# Patient Record
Sex: FEMALE | Race: WHITE | Employment: OTHER | URBAN - METROPOLITAN AREA
[De-identification: names, ages, dates, MRNs, and addresses within clinical notes are randomized per-mention and may not be internally consistent; named-entity substitution may affect disease eponyms.]

---

## 2020-01-12 ENCOUNTER — HOSPITAL ENCOUNTER (OUTPATIENT)
Age: 85
Setting detail: OBSERVATION
Discharge: HOME | End: 2020-01-14
Attending: STUDENT IN AN ORGANIZED HEALTH CARE EDUCATION/TRAINING PROGRAM | Admitting: HOSPITALIST
Payer: MEDICARE

## 2020-01-12 ENCOUNTER — APPOINTMENT (OUTPATIENT)
Dept: CT IMAGING | Age: 85
End: 2020-01-12
Attending: STUDENT IN AN ORGANIZED HEALTH CARE EDUCATION/TRAINING PROGRAM
Payer: MEDICARE

## 2020-01-12 DIAGNOSIS — E87.6 HYPOKALEMIA: Primary | ICD-10-CM

## 2020-01-12 PROBLEM — R55 SYNCOPE: Status: ACTIVE | Noted: 2020-01-12

## 2020-01-12 LAB
ALBUMIN SERPL-MCNC: 3.1 G/DL (ref 3.5–5)
ALBUMIN/GLOB SERPL: 0.8 {RATIO} (ref 1.1–2.2)
ALP SERPL-CCNC: 92 U/L (ref 45–117)
ALT SERPL-CCNC: 82 U/L (ref 12–78)
ANION GAP SERPL CALC-SCNC: 8 MMOL/L (ref 5–15)
ANION GAP SERPL CALC-SCNC: 8 MMOL/L (ref 5–15)
APPEARANCE UR: ABNORMAL
AST SERPL-CCNC: 109 U/L (ref 15–37)
ATRIAL RATE: 85 BPM
ATRIAL RATE: 93 BPM
BACTERIA URNS QL MICRO: ABNORMAL /HPF
BASOPHILS # BLD: 0.1 K/UL (ref 0–0.1)
BASOPHILS NFR BLD: 0 % (ref 0–1)
BILIRUB SERPL-MCNC: 0.9 MG/DL (ref 0.2–1)
BILIRUB UR QL: NEGATIVE
BUN SERPL-MCNC: 40 MG/DL (ref 6–20)
BUN SERPL-MCNC: 42 MG/DL (ref 6–20)
BUN/CREAT SERPL: 41 (ref 12–20)
BUN/CREAT SERPL: 43 (ref 12–20)
CALCIUM SERPL-MCNC: 9 MG/DL (ref 8.5–10.1)
CALCIUM SERPL-MCNC: 9.3 MG/DL (ref 8.5–10.1)
CALCULATED P AXIS, ECG09: 61 DEGREES
CALCULATED P AXIS, ECG09: 64 DEGREES
CALCULATED R AXIS, ECG10: 72 DEGREES
CALCULATED R AXIS, ECG10: 76 DEGREES
CALCULATED T AXIS, ECG11: -60 DEGREES
CALCULATED T AXIS, ECG11: 33 DEGREES
CHLORIDE SERPL-SCNC: 85 MMOL/L (ref 97–108)
CHLORIDE SERPL-SCNC: 85 MMOL/L (ref 97–108)
CO2 SERPL-SCNC: 39 MMOL/L (ref 21–32)
CO2 SERPL-SCNC: 40 MMOL/L (ref 21–32)
COLOR UR: ABNORMAL
CREAT SERPL-MCNC: 0.94 MG/DL (ref 0.55–1.02)
CREAT SERPL-MCNC: 1.02 MG/DL (ref 0.55–1.02)
DIAGNOSIS, 93000: NORMAL
DIAGNOSIS, 93000: NORMAL
DIFFERENTIAL METHOD BLD: ABNORMAL
EOSINOPHIL # BLD: 0.1 K/UL (ref 0–0.4)
EOSINOPHIL NFR BLD: 1 % (ref 0–7)
EPITH CASTS URNS QL MICRO: ABNORMAL /LPF
ERYTHROCYTE [DISTWIDTH] IN BLOOD BY AUTOMATED COUNT: 12.7 % (ref 11.5–14.5)
GLOBULIN SER CALC-MCNC: 3.7 G/DL (ref 2–4)
GLUCOSE SERPL-MCNC: 110 MG/DL (ref 65–100)
GLUCOSE SERPL-MCNC: 117 MG/DL (ref 65–100)
GLUCOSE UR STRIP.AUTO-MCNC: NEGATIVE MG/DL
HCT VFR BLD AUTO: 45.8 % (ref 35–47)
HGB BLD-MCNC: 15.3 G/DL (ref 11.5–16)
HGB UR QL STRIP: NEGATIVE
IMM GRANULOCYTES # BLD AUTO: 0.1 K/UL (ref 0–0.04)
IMM GRANULOCYTES NFR BLD AUTO: 0 % (ref 0–0.5)
KETONES UR QL STRIP.AUTO: 40 MG/DL
LEUKOCYTE ESTERASE UR QL STRIP.AUTO: ABNORMAL
LIPASE SERPL-CCNC: 353 U/L (ref 73–393)
LYMPHOCYTES # BLD: 1.3 K/UL (ref 0.8–3.5)
LYMPHOCYTES NFR BLD: 11 % (ref 12–49)
MAGNESIUM SERPL-MCNC: 2.3 MG/DL (ref 1.6–2.4)
MAGNESIUM SERPL-MCNC: 2.4 MG/DL (ref 1.6–2.4)
MCH RBC QN AUTO: 29.9 PG (ref 26–34)
MCHC RBC AUTO-ENTMCNC: 33.4 G/DL (ref 30–36.5)
MCV RBC AUTO: 89.5 FL (ref 80–99)
MONOCYTES # BLD: 1.2 K/UL (ref 0–1)
MONOCYTES NFR BLD: 10 % (ref 5–13)
NEUTS SEG # BLD: 9.1 K/UL (ref 1.8–8)
NEUTS SEG NFR BLD: 78 % (ref 32–75)
NITRITE UR QL STRIP.AUTO: NEGATIVE
NRBC # BLD: 0 K/UL (ref 0–0.01)
NRBC BLD-RTO: 0 PER 100 WBC
P-R INTERVAL, ECG05: 118 MS
P-R INTERVAL, ECG05: 128 MS
PH UR STRIP: 7.5 [PH] (ref 5–8)
PLATELET # BLD AUTO: 261 K/UL (ref 150–400)
PMV BLD AUTO: 11.5 FL (ref 8.9–12.9)
POTASSIUM SERPL-SCNC: 2.6 MMOL/L (ref 3.5–5.1)
POTASSIUM SERPL-SCNC: 2.7 MMOL/L (ref 3.5–5.1)
PROT SERPL-MCNC: 6.8 G/DL (ref 6.4–8.2)
PROT UR STRIP-MCNC: 30 MG/DL
Q-T INTERVAL, ECG07: 432 MS
Q-T INTERVAL, ECG07: 460 MS
QRS DURATION, ECG06: 92 MS
QRS DURATION, ECG06: 96 MS
QTC CALCULATION (BEZET), ECG08: 514 MS
QTC CALCULATION (BEZET), ECG08: 571 MS
RBC # BLD AUTO: 5.12 M/UL (ref 3.8–5.2)
RBC #/AREA URNS HPF: ABNORMAL /HPF (ref 0–5)
SODIUM SERPL-SCNC: 132 MMOL/L (ref 136–145)
SODIUM SERPL-SCNC: 133 MMOL/L (ref 136–145)
SP GR UR REFRACTOMETRY: 1.02 (ref 1–1.03)
TROPONIN I SERPL-MCNC: <0.05 NG/ML
UR CULT HOLD, URHOLD: NORMAL
UROBILINOGEN UR QL STRIP.AUTO: 1 EU/DL (ref 0.2–1)
VENTRICULAR RATE, ECG03: 85 BPM
VENTRICULAR RATE, ECG03: 93 BPM
WBC # BLD AUTO: 11.7 K/UL (ref 3.6–11)
WBC URNS QL MICRO: ABNORMAL /HPF (ref 0–4)

## 2020-01-12 PROCEDURE — 96366 THER/PROPH/DIAG IV INF ADDON: CPT

## 2020-01-12 PROCEDURE — 74011250636 HC RX REV CODE- 250/636: Performed by: HOSPITALIST

## 2020-01-12 PROCEDURE — 83735 ASSAY OF MAGNESIUM: CPT

## 2020-01-12 PROCEDURE — 99284 EMERGENCY DEPT VISIT MOD MDM: CPT

## 2020-01-12 PROCEDURE — 70450 CT HEAD/BRAIN W/O DYE: CPT

## 2020-01-12 PROCEDURE — 99218 HC RM OBSERVATION: CPT

## 2020-01-12 PROCEDURE — 36415 COLL VENOUS BLD VENIPUNCTURE: CPT

## 2020-01-12 PROCEDURE — 96374 THER/PROPH/DIAG INJ IV PUSH: CPT

## 2020-01-12 PROCEDURE — 93005 ELECTROCARDIOGRAM TRACING: CPT

## 2020-01-12 PROCEDURE — 85025 COMPLETE CBC W/AUTO DIFF WBC: CPT

## 2020-01-12 PROCEDURE — 77030019905 HC CATH URETH INTMIT MDII -A

## 2020-01-12 PROCEDURE — 84484 ASSAY OF TROPONIN QUANT: CPT

## 2020-01-12 PROCEDURE — 96365 THER/PROPH/DIAG IV INF INIT: CPT

## 2020-01-12 PROCEDURE — 80053 COMPREHEN METABOLIC PANEL: CPT

## 2020-01-12 PROCEDURE — 74011250636 HC RX REV CODE- 250/636: Performed by: STUDENT IN AN ORGANIZED HEALTH CARE EDUCATION/TRAINING PROGRAM

## 2020-01-12 PROCEDURE — 83690 ASSAY OF LIPASE: CPT

## 2020-01-12 PROCEDURE — 96361 HYDRATE IV INFUSION ADD-ON: CPT

## 2020-01-12 PROCEDURE — 81001 URINALYSIS AUTO W/SCOPE: CPT

## 2020-01-12 PROCEDURE — 74011250637 HC RX REV CODE- 250/637: Performed by: STUDENT IN AN ORGANIZED HEALTH CARE EDUCATION/TRAINING PROGRAM

## 2020-01-12 PROCEDURE — 74011250637 HC RX REV CODE- 250/637: Performed by: HOSPITALIST

## 2020-01-12 PROCEDURE — 96375 TX/PRO/DX INJ NEW DRUG ADDON: CPT

## 2020-01-12 RX ORDER — AMITRIPTYLINE HYDROCHLORIDE 10 MG/1
10 TABLET, FILM COATED ORAL
Status: DISCONTINUED | OUTPATIENT
Start: 2020-01-12 | End: 2020-01-12

## 2020-01-12 RX ORDER — SODIUM CHLORIDE 9 MG/ML
100 INJECTION, SOLUTION INTRAVENOUS CONTINUOUS
Status: DISCONTINUED | OUTPATIENT
Start: 2020-01-12 | End: 2020-01-12

## 2020-01-12 RX ORDER — PHENOL/SODIUM PHENOLATE
20 AEROSOL, SPRAY (ML) MUCOUS MEMBRANE DAILY
COMMUNITY

## 2020-01-12 RX ORDER — ROSUVASTATIN CALCIUM 10 MG/1
10 TABLET, COATED ORAL
COMMUNITY
End: 2020-01-12

## 2020-01-12 RX ORDER — ANASTROZOLE 1 MG/1
1 TABLET ORAL DAILY
COMMUNITY

## 2020-01-12 RX ORDER — POTASSIUM CHLORIDE 7.45 MG/ML
10 INJECTION INTRAVENOUS
Status: DISCONTINUED | OUTPATIENT
Start: 2020-01-12 | End: 2020-01-13

## 2020-01-12 RX ORDER — SODIUM CHLORIDE 0.9 % (FLUSH) 0.9 %
5-40 SYRINGE (ML) INJECTION AS NEEDED
Status: DISCONTINUED | OUTPATIENT
Start: 2020-01-12 | End: 2020-01-14 | Stop reason: HOSPADM

## 2020-01-12 RX ORDER — ONDANSETRON 2 MG/ML
4 INJECTION INTRAMUSCULAR; INTRAVENOUS
Status: DISCONTINUED | OUTPATIENT
Start: 2020-01-12 | End: 2020-01-12

## 2020-01-12 RX ORDER — ANASTROZOLE 1 MG/1
1 TABLET ORAL DAILY
Status: DISCONTINUED | OUTPATIENT
Start: 2020-01-13 | End: 2020-01-14 | Stop reason: HOSPADM

## 2020-01-12 RX ORDER — AMITRIPTYLINE HYDROCHLORIDE 10 MG/1
10 TABLET, FILM COATED ORAL
COMMUNITY

## 2020-01-12 RX ORDER — LOSARTAN POTASSIUM 50 MG/1
50 TABLET ORAL DAILY
COMMUNITY
End: 2020-01-14

## 2020-01-12 RX ORDER — DILTIAZEM HYDROCHLORIDE 180 MG/1
180 CAPSULE, COATED, EXTENDED RELEASE ORAL DAILY
Status: DISCONTINUED | OUTPATIENT
Start: 2020-01-13 | End: 2020-01-13

## 2020-01-12 RX ORDER — HYDROCHLOROTHIAZIDE 25 MG/1
25 TABLET ORAL DAILY
COMMUNITY
End: 2020-01-14

## 2020-01-12 RX ORDER — POTASSIUM CHLORIDE 750 MG/1
10 TABLET, FILM COATED, EXTENDED RELEASE ORAL DAILY
COMMUNITY

## 2020-01-12 RX ORDER — LOSARTAN POTASSIUM 50 MG/1
50 TABLET ORAL DAILY
Status: DISCONTINUED | OUTPATIENT
Start: 2020-01-13 | End: 2020-01-13

## 2020-01-12 RX ORDER — SODIUM CHLORIDE 0.9 % (FLUSH) 0.9 %
5-40 SYRINGE (ML) INJECTION EVERY 8 HOURS
Status: DISCONTINUED | OUTPATIENT
Start: 2020-01-12 | End: 2020-01-14 | Stop reason: HOSPADM

## 2020-01-12 RX ORDER — DILTIAZEM HYDROCHLORIDE EXTENDED-RELEASE TABLETS 180 MG/1
180 TABLET, EXTENDED RELEASE ORAL DAILY
COMMUNITY
End: 2020-01-14

## 2020-01-12 RX ORDER — ONDANSETRON 2 MG/ML
4 INJECTION INTRAMUSCULAR; INTRAVENOUS
Status: COMPLETED | OUTPATIENT
Start: 2020-01-12 | End: 2020-01-12

## 2020-01-12 RX ORDER — TIZANIDINE 4 MG/1
4 TABLET ORAL
COMMUNITY

## 2020-01-12 RX ORDER — NALOXONE HYDROCHLORIDE 0.4 MG/ML
0.4 INJECTION, SOLUTION INTRAMUSCULAR; INTRAVENOUS; SUBCUTANEOUS AS NEEDED
Status: DISCONTINUED | OUTPATIENT
Start: 2020-01-12 | End: 2020-01-14 | Stop reason: HOSPADM

## 2020-01-12 RX ORDER — AMITRIPTYLINE HYDROCHLORIDE 10 MG/1
10 TABLET, FILM COATED ORAL
Status: DISCONTINUED | OUTPATIENT
Start: 2020-01-12 | End: 2020-01-14 | Stop reason: HOSPADM

## 2020-01-12 RX ORDER — POTASSIUM CHLORIDE AND SODIUM CHLORIDE 900; 300 MG/100ML; MG/100ML
INJECTION, SOLUTION INTRAVENOUS CONTINUOUS
Status: DISCONTINUED | OUTPATIENT
Start: 2020-01-12 | End: 2020-01-14 | Stop reason: HOSPADM

## 2020-01-12 RX ORDER — ONDANSETRON 4 MG/1
4 TABLET, ORALLY DISINTEGRATING ORAL
COMMUNITY

## 2020-01-12 RX ORDER — ASPIRIN 81 MG/1
TABLET ORAL DAILY
COMMUNITY

## 2020-01-12 RX ORDER — ONDANSETRON 2 MG/ML
4 INJECTION INTRAMUSCULAR; INTRAVENOUS
Status: DISCONTINUED | OUTPATIENT
Start: 2020-01-12 | End: 2020-01-14 | Stop reason: HOSPADM

## 2020-01-12 RX ADMIN — POTASSIUM CHLORIDE 10 MEQ: 10 INJECTION, SOLUTION INTRAVENOUS at 16:40

## 2020-01-12 RX ADMIN — Medication 10 ML: at 21:46

## 2020-01-12 RX ADMIN — SODIUM CHLORIDE 1000 ML: 900 INJECTION, SOLUTION INTRAVENOUS at 12:43

## 2020-01-12 RX ADMIN — POTASSIUM CHLORIDE 10 MEQ: 10 INJECTION, SOLUTION INTRAVENOUS at 21:46

## 2020-01-12 RX ADMIN — AMITRIPTYLINE HYDROCHLORIDE 10 MG: 10 TABLET, FILM COATED ORAL at 22:22

## 2020-01-12 RX ADMIN — SODIUM CHLORIDE 1000 ML: 900 INJECTION, SOLUTION INTRAVENOUS at 10:27

## 2020-01-12 RX ADMIN — SODIUM CHLORIDE 100 ML/HR: 900 INJECTION, SOLUTION INTRAVENOUS at 21:46

## 2020-01-12 RX ADMIN — POTASSIUM BICARBONATE 20 MEQ: 782 TABLET, EFFERVESCENT ORAL at 12:43

## 2020-01-12 RX ADMIN — POTASSIUM CHLORIDE 10 MEQ: 10 INJECTION, SOLUTION INTRAVENOUS at 12:43

## 2020-01-12 RX ADMIN — ONDANSETRON 4 MG: 2 INJECTION INTRAMUSCULAR; INTRAVENOUS at 12:49

## 2020-01-12 NOTE — PROGRESS NOTES
Admission Medication Reconciliation:    Information obtained from:  Patient's daughter, Carla Perez data available¹:  YES    Comments/Recommendations: Updated PTA meds/reviewed patient's allergies. Patient and her daughter are somewhat reliable historians. Interviewed both regarding use of PTA medications including prescription/OTC, vitamins, supplements, inhaled, topical, injectable, otic and ophthalmic medications. Also inquired as to use of alcohol, nicotine products, THC and related compounds, illicit drugs, stimulant use (i.e., Red Bull), agents used to assist with sleep and/or pain control issues, and whether patient uses other people's medications of any kind. Notes:  1. Elavil: states that she takes a \"low dose\" every night and has had for years, for her foot pain. RX Query has no information on this, I have entered lowest dose now (so as not to disrupt therapy) and will update once daughter brings medication bottle in.  2. Oral intake: She is scheduled for surgery ( GJ tube)  in 6 days (in [place]). She only can tolerates full liquid diet lately. 3. ASA: held x 2 weeks in anticipation of upcoming surgical procedure  4. Flu vaccine is current    Medication changes (never reviewed, visiting from [place], [place]):  Added all agents    Thank you for allowing me to participate in the care of your patient. Jordyn Smith PharmD, RN #[id number] [facility] benefit data reflects medications filled and processed through the patient's insurance, however   this data does NOT capture whether the medication was picked up or is currently being taken by the patient. Allergies:  Patient has no known allergies.     Significant PMH/Disease States:   Past Medical History:   Diagnosis Date    Breast cancer ([facility] [address]     right    Hypercholesteremia      Chief Complaint for this Admission:    Chief Complaint   Patient presents with    Syncope     Prior to Admission Medications:   Prior to Admission Medications   Prescriptions Last Dose Informant Taking? Omeprazole delayed release (PRILOSEC D/R) 20 mg tablet 1/5/2020 at Unknown time  Yes   Sig: Take 20 mg by mouth daily. amitriptyline (ELAVIL) 10 mg tablet 1/11/2020 at Unknown time  Yes   Sig: Take 10 mg by mouth nightly. anastrozole (ARIMIDEX) 1 mg tablet 1/5/2020 at Unknown time  Yes   Sig: Take 1 mg by mouth daily. dilTIAZem ER (CARDIZEM LA) 180 mg Tb24 tablet 1/5/2020 at Unknown time  Yes   Sig: Take 180 mg by mouth daily. hydroCHLOROthiazide (HYDRODIURIL) 25 mg tablet 1/5/2020 at Unknown time  Yes   Sig: Take 25 mg by mouth daily. losartan (COZAAR) 50 mg tablet 1/5/2020 at Unknown time  Yes   Sig: Take 50 mg by mouth daily. ondansetron (ZOFRAN ODT) 4 mg disintegrating tablet 1/12/2020 at Unknown time  Yes   Sig: Take 4 mg by mouth every eight (8) hours as needed for Nausea or Vomiting. potassium chloride SR (KLOR-CON 10) 10 mEq tablet 1/5/2020 at Unknown time  Yes   Sig: Take 10 mEq by mouth daily. tiZANidine (ZANAFLEX) 4 mg tablet 1/5/2020 at Unknown time  Yes   Sig: Take 4 mg by mouth three (3) times daily as needed for Muscle Spasm(s). Facility-Administered Medications: None       Please contact the main inpatient pharmacy with any questions or concerns at [phone] and we will direct you to the clinical pharmacist covering this patient's care while in-house.    CHINA Barlow

## 2020-01-12 NOTE — ED PROVIDER NOTES
78-year-old woman with history of CBD obstruction noted for the past several months status post failed ERCP, scheduled for surgery in 6 days in where she resides presenting with a syncopal episode. Patient got out of bed, felt briefly lightheaded and dizzy struck her head on the corner of the dresser. Denies headache, diplopia, visual disturbance. Is asymptomatic at this time. Denies associated chest pain, palpitations, edema. She has had significant emesis over the past several days, patient has had this on and off for the past several months. She is scheduled to have a gastrojejunostomy performed surgically. Denies fevers or chills, dysuria. Past Medical History:   Diagnosis Date    Breast cancer ([facility] [address]     right    Hypercholesteremia        History reviewed. No pertinent surgical history. No family history on file.     Social History     Socioeconomic History    Marital status: Not on file     Spouse name: Not on file    Number of children: Not on file    Years of education: Not on file    Highest education level: Not on file   Occupational History    Not on file   Social Needs    Financial resource strain: Not on file    Food insecurity:     Worry: Not on file     Inability: Not on file    Transportation needs:     Medical: Not on file     Non-medical: Not on file   Tobacco Use    Smoking status: Not on file   Substance and Sexual Activity    Alcohol use: Not on file    Drug use: Not on file    Sexual activity: Not on file   Lifestyle    Physical activity:     Days per week: Not on file     Minutes per session: Not on file    Stress: Not on file   Relationships    Social connections:     Talks on phone: Not on file     Gets together: Not on file     Attends [employer]ism service: Not on file     Active member of club or organization: Not on file     Attends meetings of clubs or organizations: Not on file     Relationship status: Not on file    Intimate partner violence:     Fear of current or ex partner: Not on file     Emotionally abused: Not on file     Physically abused: Not on file     Forced sexual activity: Not on file   Other Topics Concern    Not on file   Social History Narrative    Not on file         ALLERGIES: Patient has no known allergies. Review of Systems   Constitutional: Negative for chills and fever. Eyes: Negative for photophobia. Respiratory: Negative for shortness of breath. Cardiovascular: Negative for chest pain. Gastrointestinal: Negative for abdominal pain. Genitourinary: Negative for dysuria. Musculoskeletal: Negative for back pain. Neurological: Negative for headaches. Psychiatric/Behavioral: Negative for confusion. All other systems reviewed and are negative. Vitals:    01/12/20 1009   Pulse: 92   Resp: 16   Temp: 97.5 °F (36.4 °C)   SpO2: 99%            Physical Exam  Vitals signs and nursing note reviewed. Constitutional:       General: She is not in acute distress. Appearance: She is well-developed. HENT:      Head: Normocephalic and atraumatic. Mouth/Throat:      Mouth: Mucous membranes are moist.      Pharynx: No oropharyngeal exudate. Eyes:      Extraocular Movements: Extraocular movements intact. Pupils: Pupils are equal, round, and reactive to light. Comments: 3mm abrasion L upper lid    Neck:      Musculoskeletal: Normal range of motion. Cardiovascular:      Rate and Rhythm: Normal rate. Pulmonary:      Effort: Pulmonary effort is normal.   Chest:      Chest wall: No tenderness. Abdominal:      General: There is no distension. Palpations: Abdomen is soft. Tenderness: There is no tenderness. Musculoskeletal:         General: No deformity. Comments: Entire spine palpated, no tenderness or deformity. Skin:     General: Skin is warm and dry. Capillary Refill: Capillary refill takes less than 2 seconds.    Neurological:      Mental Status: She is alert and oriented to person, place, and time. Psychiatric:         Behavior: Behavior normal.          MDM  Number of Diagnoses or Management Options  Hypokalemia:   Diagnosis management comments: Patient presenting with an episode of syncope on standing, has been getting progressively weaker over the past several weeks, has chronic CBD obstruction and has been on a liquid diet but regardless has been vomiting of late. Abdomen is soft and nontender. Patient does not appear to be septic or have cholangitis. Significant hypokalemia noted, patient would prefer not to be admitted but rather to return [place] where her medical providers are and where she has a surgery planned to have a GJ tube placed in a few days. Will correct potassium with IV repletion, reassess. 2:22 PM  Patient had an unusual side effect while receiving IV potassium, did complain of arm pain however also was feeling lightheaded and short of breath. Rhythm observed to be in sinus rhythm although was mildly tachycardic in the 100-110 range, infusion stopped for now. Repeat EKG with mildly prolonged QTC, no arrhythmias. Awaiting magnesium result, initial result hemolyzed. Will place a large-bore IV. Given her deficits and severe hypokalemia plan to admit for repletion. Hospitalist Akilah Serve for Admission  2:23 PM    ED Room Number: ER08/08  Patient Name and age:  Deann Germain 80 y.o.  female  Working Diagnosis: Hypokalemia  (primary encounter diagnosis)  Readmission: no  Isolation Requirements:  no  Recommended Level of Care:  telemetry  Code Status:  Full Code  Department:[facility] Adult ED - [phone]  Other:            Procedures      EKG: 10:17 AM  Normal sinus rhythm, scooped appearing ST depression in the inferior lateral leads likely secondary to electrolyte abnormality.   Normal axis,

## 2020-01-12 NOTE — ROUTINE PROCESS
TRANSFER - OUT REPORT:    Verbal report given to Zheng RN(name) on Guera Vásquez  being transferred to (unit) for routine progression of care       Report consisted of patients Situation, Background, Assessment and   Recommendations(SBAR). Information from the following report(s) SBAR, Kardex, ED Summary and MAR was reviewed with the receiving nurse. Lines:   Peripheral IV 01/12/20 Left; Lower Forearm (Active)   Site Assessment Clean, dry, & intact 1/12/2020 10:31 AM   Phlebitis Assessment 0 1/12/2020 10:31 AM   Infiltration Assessment 0 1/12/2020 10:31 AM   Dressing Status Clean, dry, & intact 1/12/2020 10:31 AM        Opportunity for questions and clarification was provided.       Patient transported with:   Needish

## 2020-01-12 NOTE — H&P
History & Physical    Primary Care Provider: Other, MD Maryam  Source of Information: Patient     History of Presenting Illness:   Roger Tristan is a 80 y.o. female who presents with syncope     51-year-old woman lives in [place], [place] who is here visiting her daughter since Velvet, came with episode of syncope. 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 CBD obstruction s/p stent and pancreatic abnormal which is still under workup. She is scheduled for surgery ( GJ tube)  in 6 days in her where she resides. She only can tolerates full liquid diet lately. She still has intermittent vomiting. Lost 20 lbs last 2-3 months. Today,  Patient got out of bed, felt briefly lightheaded and dizzy struck her head on the corner of the dresser. Denies headache, diplopia, visual disturbance. Is asymptomatic at this time. Denies associated chest pain, palpitations, edema. Denies fevers or chills, dysuria. Had similar syncope in [facility] in Dec and told was due to dehydration. Review of Systems:  General: HPI, sleep ok  HEENT: no headache, no vision changes, no nose discharge, no hearing changes   RES: no wheezing, no cough, no sob  CVS: no cp, no palpitation. Muscular: no joint swelling, no muscle pain, no leg swelling  Skin: no rash, no itching   GI: HPI   : no dysuria, no hematuria  Hemo: no gum bleeding, no petechial   Neuro: no sensation changes, no focal weakness   Endo: no polydipsia   Psych: denied depression     Past Medical History:   Diagnosis Date    Breast cancer ([address]     right    Hypercholesteremia       History reviewed. No pertinent surgical history. Prior to Admission medications    Not on File     No Known Allergies   History reviewed. No pertinent family history.      SOCIAL HISTORY:  Patient resides:  Independently x   Assisted Living    SNF    With family care       Smoking history: None x   Former    Chronic      Alcohol history:   None x   Social    Chronic      Ambulates:   Independently x   w/cane    w/walker    w/wc    CODE STATUS:  DNR    Full x   Other      Objective:     Physical Exam:     Visit Vitals  /61   Pulse 89   Temp 97.5 °F (36.4 °C)   Resp 15   SpO2 96%      O2 Device: Room air    General:  Alert, cooperative, no distress, appears stated age. Head:  Normocephalic, without obvious abnormality, atraumatic. Eyes:  Conjunctivae/corneas clear. PERRL, EOMs intact. Nose: Nares normal. Septum midline. Mucosa normal. No drainage or sinus tenderness. Throat: Lips, mucosa, and tonguedry. Neck: Supple, symmetrical, trachea midline, no adenopathy, thyroid: no enlargement/tenderness/nodules, no carotid bruit and no JVD. Back:   Symmetric, no curvature. ROM normal. No CVA tenderness. Lungs:   Clear to auscultation bilaterally. Chest wall:  No tenderness or deformity. Heart:  Regular rate and rhythm, S1, S2 normal, no murmur, click, rub or gallop. Abdomen:   Soft, non-tender. Bowel sounds normal. No masses,  No organomegaly. Extremities: Extremities normal, atraumatic, no cyanosis or edema. Pulses: 2+ and symmetric all extremities. Skin: Skin color, texture, turgor normal. No rashes or lesions   Neurologic: CNII-XII intact. No focal weakness              Data Review:     Recent Days:  Recent Labs     01/12/20  1019   WBC 11.7*   HGB 15.3   HCT 45.8        Recent Labs     01/12/20  1406 01/12/20  1109   * 132*   K 2.6* 2.7*   CL 85* 85*   CO2 40* 39*   * 117*   BUN 40* 42*   CREA 0.94 1.02   CA 9.3 9.0   MG 2.3 2.4   ALB  --  3.1*   SGOT  --  109*   ALT  --  82*     No results for input(s): PH, PCO2, PO2, HCO3, FIO2 in the last 72 hours.     24 Hour Results:  Recent Results (from the past 24 hour(s))   EKG, 12 LEAD, INITIAL    Collection Time: 01/12/20 10:17 AM   Result Value Ref Range    Ventricular Rate 85 BPM    Atrial Rate 85 BPM    P-R Interval 128 ms    QRS Duration 96 ms    Q-T Interval 432 ms    QTC Calculation (Bezet) 514 ms    Calculated P Axis 64 degrees    Calculated R Axis 72 degrees    Calculated T Axis -60 degrees    Diagnosis       Normal sinus rhythm  ST & T wave abnormality, consider inferolateral ischemia  Prolonged QT  No previous ECGs available     CBC WITH AUTOMATED DIFF    Collection Time: 01/12/20 10:19 AM   Result Value Ref Range    WBC 11.7 (H) 3.6 - 11.0 K/uL    RBC 5.12 3.80 - 5.20 M/uL    HGB 15.3 11.5 - 16.0 g/dL    HCT 45.8 35.0 - 47.0 %    MCV 89.5 80.0 - 99.0 FL    MCH 29.9 26.0 - 34.0 PG    MCHC 33.4 30.0 - 36.5 g/dL    RDW 12.7 11.5 - 14.5 %    PLATELET 716 269 - 242 K/uL    MPV 11.5 8.9 - 12.9 FL    NRBC 0.0 0  WBC    ABSOLUTE NRBC 0.00 0.00 - 0.01 K/uL    NEUTROPHILS 78 (H) 32 - 75 %    LYMPHOCYTES 11 (L) 12 - 49 %    MONOCYTES 10 5 - 13 %    EOSINOPHILS 1 0 - 7 %    BASOPHILS 0 0 - 1 %    IMMATURE GRANULOCYTES 0 0.0 - 0.5 %    ABS. NEUTROPHILS 9.1 (H) 1.8 - 8.0 K/UL    ABS. LYMPHOCYTES 1.3 0.8 - 3.5 K/UL    ABS. MONOCYTES 1.2 (H) 0.0 - 1.0 K/UL    ABS. EOSINOPHILS 0.1 0.0 - 0.4 K/UL    ABS. BASOPHILS 0.1 0.0 - 0.1 K/UL    ABS. IMM.  GRANS. 0.1 (H) 0.00 - 0.04 K/UL    DF AUTOMATED     TROPONIN I    Collection Time: 01/12/20 11:09 AM   Result Value Ref Range    Troponin-I, Qt. <0.05 <0.05 ng/mL   LIPASE    Collection Time: 01/12/20 11:09 AM   Result Value Ref Range    Lipase 353 73 - 728 U/L   METABOLIC PANEL, COMPREHENSIVE    Collection Time: 01/12/20 11:09 AM   Result Value Ref Range    Sodium 132 (L) 136 - 145 mmol/L    Potassium 2.7 (LL) 3.5 - 5.1 mmol/L    Chloride 85 (L) 97 - 108 mmol/L    CO2 39 (H) 21 - 32 mmol/L    Anion gap 8 5 - 15 mmol/L    Glucose 117 (H) 65 - 100 mg/dL    BUN 42 (H) 6 - 20 MG/DL    Creatinine 1.02 0.55 - 1.02 MG/DL    BUN/Creatinine ratio 41 (H) 12 - 20      GFR est AA >60 >60 ml/min/1.73m2    GFR est non-AA 51 (L) >60 ml/min/1.73m2    Calcium 9.0 8.5 - 10.1 MG/DL    Bilirubin, total 0.9 0.2 - 1.0 MG/DL    ALT (SGPT) 82 (H) 12 - 78 U/L    AST (SGOT) 109 (H) 15 - 37 U/L    Alk. phosphatase 92 45 - 117 U/L    Protein, total 6.8 6.4 - 8.2 g/dL    Albumin 3.1 (L) 3.5 - 5.0 g/dL    Globulin 3.7 2.0 - 4.0 g/dL    A-G Ratio 0.8 (L) 1.1 - 2.2     MAGNESIUM    Collection Time: 01/12/20 11:09 AM   Result Value Ref Range    Magnesium 2.4 1.6 - 2.4 mg/dL   EKG, 12 LEAD, INITIAL    Collection Time: 01/12/20  1:33 PM   Result Value Ref Range    Ventricular Rate 93 BPM    Atrial Rate 93 BPM    P-R Interval 118 ms    QRS Duration 92 ms    Q-T Interval 460 ms    QTC Calculation (Bezet) 571 ms    Calculated P Axis 61 degrees    Calculated R Axis 76 degrees    Calculated T Axis 33 degrees    Diagnosis       Normal sinus rhythm  Nonspecific ST and T wave abnormality  Prolonged QT  When compared with ECG of 12-JAN-2020 10:17,  MANUAL COMPARISON REQUIRED, DATA IS UNCONFIRMED     MAGNESIUM    Collection Time: 01/12/20  2:06 PM   Result Value Ref Range    Magnesium 2.3 1.6 - 2.4 mg/dL   METABOLIC PANEL, BASIC    Collection Time: 01/12/20  2:06 PM   Result Value Ref Range    Sodium 133 (L) 136 - 145 mmol/L    Potassium 2.6 (LL) 3.5 - 5.1 mmol/L    Chloride 85 (L) 97 - 108 mmol/L    CO2 40 (H) 21 - 32 mmol/L    Anion gap 8 5 - 15 mmol/L    Glucose 110 (H) 65 - 100 mg/dL    BUN 40 (H) 6 - 20 MG/DL    Creatinine 0.94 0.55 - 1.02 MG/DL    BUN/Creatinine ratio 43 (H) 12 - 20      GFR est AA >60 >60 ml/min/1.73m2    GFR est non-AA 56 (L) >60 ml/min/1.73m2    Calcium 9.3 8.5 - 10.1 MG/DL         Imaging:   Ct Head Wo Cont    Result Date: 1/12/2020  IMPRESSION: No acute abnormality. Assessment:     Active Problems:    Syncope (1/12/2020)           Plan:     1. Syncope: likely due to hypovolemia, orthostatic hypotension possible. Per history she also has some aortic stenosis. But patient and daughter are planning to return back [place] tomorrow morning after hydration. No further test will do.  She had echo done in [place] recently. 2. Hypokalemia: iv kcl 40meg, will repeat bmp tonight. 3. Hyponatremia: due to poor oral intake, she also took HCTZ, but not taking last 2 days. Should stop HCTZ. 4. HTN: hold bp meds today. Stop HCTZ. Can resume cardizem cd and losartan in am if no more orthostatic hypotension. 5. Gastric outlet obstruciton, patient prefer to go back to [place] to see her surgeon and proceed with her scheduled surgery.         Signed By: Julia Yen MD     January 12, 2020

## 2020-01-12 NOTE — ED TRIAGE NOTES
Triage note: Pt arrives via EMS with c/o syncopal episode. Head hit dresser. Pt on liquid diet since Christmas for SBO, surgery scheduled Friday. Denies blood thinners.

## 2020-01-13 LAB
ALBUMIN SERPL-MCNC: 2.6 G/DL (ref 3.5–5)
ALBUMIN/GLOB SERPL: 0.9 {RATIO} (ref 1.1–2.2)
ALP SERPL-CCNC: 78 U/L (ref 45–117)
ALT SERPL-CCNC: 52 U/L (ref 12–78)
ANION GAP SERPL CALC-SCNC: 5 MMOL/L (ref 5–15)
AST SERPL-CCNC: 41 U/L (ref 15–37)
BASOPHILS # BLD: 0.1 K/UL (ref 0–0.1)
BASOPHILS NFR BLD: 1 % (ref 0–1)
BILIRUB SERPL-MCNC: 0.7 MG/DL (ref 0.2–1)
BUN SERPL-MCNC: 28 MG/DL (ref 6–20)
BUN/CREAT SERPL: 47 (ref 12–20)
CALCIUM SERPL-MCNC: 8.6 MG/DL (ref 8.5–10.1)
CHLORIDE SERPL-SCNC: 94 MMOL/L (ref 97–108)
CO2 SERPL-SCNC: 34 MMOL/L (ref 21–32)
COMMENT, HOLDF: NORMAL
CREAT SERPL-MCNC: 0.6 MG/DL (ref 0.55–1.02)
DIFFERENTIAL METHOD BLD: ABNORMAL
EOSINOPHIL # BLD: 0.3 K/UL (ref 0–0.4)
EOSINOPHIL NFR BLD: 3 % (ref 0–7)
ERYTHROCYTE [DISTWIDTH] IN BLOOD BY AUTOMATED COUNT: 12.5 % (ref 11.5–14.5)
GLOBULIN SER CALC-MCNC: 3 G/DL (ref 2–4)
GLUCOSE SERPL-MCNC: 94 MG/DL (ref 65–100)
HCT VFR BLD AUTO: 36 % (ref 35–47)
HGB BLD-MCNC: 12 G/DL (ref 11.5–16)
IMM GRANULOCYTES # BLD AUTO: 0 K/UL (ref 0–0.04)
IMM GRANULOCYTES NFR BLD AUTO: 0 % (ref 0–0.5)
LYMPHOCYTES # BLD: 1.2 K/UL (ref 0.8–3.5)
LYMPHOCYTES NFR BLD: 13 % (ref 12–49)
MAGNESIUM SERPL-MCNC: 2.2 MG/DL (ref 1.6–2.4)
MCH RBC QN AUTO: 30.1 PG (ref 26–34)
MCHC RBC AUTO-ENTMCNC: 33.3 G/DL (ref 30–36.5)
MCV RBC AUTO: 90.2 FL (ref 80–99)
MONOCYTES # BLD: 1.3 K/UL (ref 0–1)
MONOCYTES NFR BLD: 14 % (ref 5–13)
NEUTS SEG # BLD: 6.8 K/UL (ref 1.8–8)
NEUTS SEG NFR BLD: 70 % (ref 32–75)
NRBC # BLD: 0 K/UL (ref 0–0.01)
NRBC BLD-RTO: 0 PER 100 WBC
PHOSPHATE SERPL-MCNC: 1.5 MG/DL (ref 2.6–4.7)
PLATELET # BLD AUTO: 207 K/UL (ref 150–400)
PMV BLD AUTO: 11.1 FL (ref 8.9–12.9)
POTASSIUM SERPL-SCNC: 2.6 MMOL/L (ref 3.5–5.1)
POTASSIUM SERPL-SCNC: 3.3 MMOL/L (ref 3.5–5.1)
PROT SERPL-MCNC: 5.6 G/DL (ref 6.4–8.2)
RBC # BLD AUTO: 3.99 M/UL (ref 3.8–5.2)
SAMPLES BEING HELD,HOLD: NORMAL
SODIUM SERPL-SCNC: 133 MMOL/L (ref 136–145)
WBC # BLD AUTO: 9.8 K/UL (ref 3.6–11)

## 2020-01-13 PROCEDURE — 99218 HC RM OBSERVATION: CPT

## 2020-01-13 PROCEDURE — 96366 THER/PROPH/DIAG IV INF ADDON: CPT

## 2020-01-13 PROCEDURE — 80053 COMPREHEN METABOLIC PANEL: CPT

## 2020-01-13 PROCEDURE — 97116 GAIT TRAINING THERAPY: CPT

## 2020-01-13 PROCEDURE — 96361 HYDRATE IV INFUSION ADD-ON: CPT

## 2020-01-13 PROCEDURE — 74011250636 HC RX REV CODE- 250/636: Performed by: HOSPITALIST

## 2020-01-13 PROCEDURE — 84132 ASSAY OF SERUM POTASSIUM: CPT

## 2020-01-13 PROCEDURE — 84100 ASSAY OF PHOSPHORUS: CPT

## 2020-01-13 PROCEDURE — 74011250636 HC RX REV CODE- 250/636: Performed by: STUDENT IN AN ORGANIZED HEALTH CARE EDUCATION/TRAINING PROGRAM

## 2020-01-13 PROCEDURE — 97161 PT EVAL LOW COMPLEX 20 MIN: CPT

## 2020-01-13 PROCEDURE — 74011000250 HC RX REV CODE- 250: Performed by: INTERNAL MEDICINE

## 2020-01-13 PROCEDURE — 74011250637 HC RX REV CODE- 250/637: Performed by: HOSPITALIST

## 2020-01-13 PROCEDURE — 83735 ASSAY OF MAGNESIUM: CPT

## 2020-01-13 PROCEDURE — 74011250636 HC RX REV CODE- 250/636: Performed by: INTERNAL MEDICINE

## 2020-01-13 PROCEDURE — 96376 TX/PRO/DX INJ SAME DRUG ADON: CPT

## 2020-01-13 PROCEDURE — 96367 TX/PROPH/DG ADDL SEQ IV INF: CPT

## 2020-01-13 PROCEDURE — 85025 COMPLETE CBC W/AUTO DIFF WBC: CPT

## 2020-01-13 PROCEDURE — 36415 COLL VENOUS BLD VENIPUNCTURE: CPT

## 2020-01-13 RX ORDER — POTASSIUM CHLORIDE 7.45 MG/ML
10 INJECTION INTRAVENOUS
Status: COMPLETED | OUTPATIENT
Start: 2020-01-13 | End: 2020-01-13

## 2020-01-13 RX ORDER — POTASSIUM CHLORIDE 14.9 MG/ML
10 INJECTION INTRAVENOUS
Status: DISCONTINUED | OUTPATIENT
Start: 2020-01-13 | End: 2020-01-13

## 2020-01-13 RX ADMIN — AMITRIPTYLINE HYDROCHLORIDE 10 MG: 10 TABLET, FILM COATED ORAL at 23:12

## 2020-01-13 RX ADMIN — Medication 10 ML: at 07:01

## 2020-01-13 RX ADMIN — POTASSIUM CHLORIDE AND SODIUM CHLORIDE: 900; 300 INJECTION, SOLUTION INTRAVENOUS at 00:20

## 2020-01-13 RX ADMIN — Medication 10 ML: at 23:13

## 2020-01-13 RX ADMIN — DILTIAZEM HYDROCHLORIDE 180 MG: 180 CAPSULE, COATED, EXTENDED RELEASE ORAL at 08:51

## 2020-01-13 RX ADMIN — POTASSIUM CHLORIDE 10 MEQ: 10 INJECTION, SOLUTION INTRAVENOUS at 08:00

## 2020-01-13 RX ADMIN — POTASSIUM CHLORIDE 10 MEQ: 10 INJECTION, SOLUTION INTRAVENOUS at 00:18

## 2020-01-13 RX ADMIN — ONDANSETRON 4 MG: 2 INJECTION INTRAMUSCULAR; INTRAVENOUS at 20:26

## 2020-01-13 RX ADMIN — POTASSIUM PHOSPHATE, MONOBASIC AND POTASSIUM PHOSPHATE, DIBASIC: 224; 236 INJECTION, SOLUTION, CONCENTRATE INTRAVENOUS at 07:44

## 2020-01-13 RX ADMIN — POTASSIUM CHLORIDE 10 MEQ: 10 INJECTION, SOLUTION INTRAVENOUS at 07:16

## 2020-01-13 NOTE — PROGRESS NOTES
A Spiritual Care Partner Volunteer visited patient in [facility] [address] on 01/13/2020.   Documented by:  Chaplain Prado MDiv, MS, [place] [id number] [phone] ([id number])

## 2020-01-13 NOTE — PROGRESS NOTES
[facility]  Hospitalist Group                                                                                          Hospitalist Progress Note  Le Jose MD  Answering service: [phone] OR [phone] from in house phone        Date of Service:  2020  NAME:  Alexandro Martinez  :  1932  MRN:  [id number]      Admission Summary:     49-year-old woman lives in [place], [place] who is here visiting her daughter since [place], came with episode of syncope. 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 CBD obstruction s/p stent and pancreatic abnormal which is still under workup. She is scheduled for surgery ( GJ tube)  in 6 days in her where she resides. She only can tolerates full liquid diet lately. She still has intermittent vomiting. Lost 20 lbs last 2-3 months. Today, Laurita Naidu got out of bed, felt briefly lightheaded and dizzy struck her head on the corner of the dresser.  Denies headache, diplopia, visual disturbance.  Is asymptomatic at this time.  Denies associated chest pain, palpitations, edema.   Denies fevers or chills, dysuria. Interval history / Subjective:       Patient is feeling better this morning. No acute complaint. Assessment & Plan:     Presyncope  Patient denies having loss of consciousness. Likely her presyncope is secondary to volume depletion with possible orthostatic hypotension. Off antihypertensives now, volume repletion with IV fluid. PT evaluated the patient and patient stable from PT standpoint. Hyponatremia  Also secondary to hypovolemic hyponatremia. Contributed by HCTZ, now on hold. Sodium level improving with normal saline. Continue monitor electrolytes. Hypokalemia  Secondary to poor p.o. intake and HCTZ. Repleting potassium. Monitor on telemetry. Monitor electrolytes. Hypertension  Hold all antihypertensives at this time. Monitor blood pressure.     History of gastric outlet obstruction  Patient to continue with full liquid diet with nutritional supplements. Patient have upcoming feeding tube placement scheduled in the near future in [place] and she gets back there. Code status: DNR  DVT prophylaxis: SCDs    Care Plan discussed with: Patient/Family and Nurse  Disposition: Home w/Family     Hospital Problems  Never Reviewed          Codes Class Noted POA    Syncope ICD-10-CM: R55  ICD-9-CM: 780.2  1/12/2020 Unknown                Review of Systems:   A comprehensive review of systems was negative except for that written in the HPI. Vital Signs:    Last 24hrs VS reviewed since prior progress note. Most recent are:  Visit Vitals  BP 97/62 (BP 1 Location: Left arm, BP Patient Position: At rest)   Pulse (!) 105   Temp 97.7 °F (36.5 °C)   Resp 14   Wt 57.6 kg (126 lb 15.8 oz)   SpO2 97%         Intake/Output Summary (Last 24 hours) at 1/13/2020 1639  Last data filed at 1/13/2020 0900  Gross per 24 hour   Intake 200 ml   Output    Net 200 ml        Physical Examination:             Constitutional:  No acute distress, cooperative, pleasant    ENT:  Oral mucous moist, oropharynx benign. Resp:  CTA bilaterally. No wheezing/rhonchi/rales. No accessory muscle use   CV:  Regular rhythm, normal rate, no murmurs, gallops, rubs    GI:  Soft, non distended, non tender. normoactive bowel sounds, no hepatosplenomegaly     Musculoskeletal:  No edema, warm, 2+ pulses throughout    Neurologic:  Moves all extremities.   AAOx3, CN II-XII reviewed     Skin:  Good turgor, no rashes or ulcers       Data Review:    Review and/or order of clinical lab test      Labs:     Recent Labs     01/13/20  0503 01/12/20  1019   WBC 9.8 11.7*   HGB 12.0 15.3   HCT 36.0 45.8    261     Recent Labs     01/13/20  0503 01/12/20  1406 01/12/20  1109   * 133* 132*   K 2.6* 2.6* 2.7*   CL 94* 85* 85*   CO2 34* 40* 39*   BUN 28* 40* 42*   CREA 0.60 0.94 1.02   GLU 94 110* 117*   CA 8.6 9.3 9.0   MG 2.2 2.3 2.4   PHOS 1.5*  --   --      Recent Labs     01/13/20  0503 01/12/20  1109   SGOT 41* 109*   ALT 52 82*   AP 78 92   TBILI 0.7 0.9   TP 5.6* 6.8   ALB 2.6* 3.1*   GLOB 3.0 3.7   LPSE  --  353     No results for input(s): INR, PTP, APTT, INREXT in the last 72 hours. No results for input(s): FE, TIBC, PSAT, FERR in the last 72 hours. No results found for: FOL, RBCF   No results for input(s): PH, PCO2, PO2 in the last 72 hours.   Recent Labs     01/12/20  1109   TROIQ <0.05     No results found for: CHOL, CHOLX, CHLST, CHOLV, HDL, HDLP, LDL, LDLC, DLDLP, TGLX, TRIGL, TRIGP, CHHD, CHHDX  No results found for: GLUCPOC  Lab Results   Component Value Date/Time    Color DARK YELLOW 01/12/2020 10:24 AM    Appearance TURBID (A) 01/12/2020 10:24 AM    Specific gravity 1.024 01/12/2020 10:24 AM    pH (UA) 7.5 01/12/2020 10:24 AM    Protein 30 (A) 01/12/2020 10:24 AM    Glucose NEGATIVE  01/12/2020 10:24 AM    Ketone 40 (A) 01/12/2020 10:24 AM    Bilirubin NEGATIVE  01/12/2020 10:24 AM    Urobilinogen 1.0 01/12/2020 10:24 AM    Nitrites NEGATIVE  01/12/2020 10:24 AM    Leukocyte Esterase TRACE (A) 01/12/2020 10:24 AM    Epithelial cells FEW 01/12/2020 10:24 AM    Bacteria 4+ (A) 01/12/2020 10:24 AM    WBC 0-4 01/12/2020 10:24 AM    RBC 0-5 01/12/2020 10:24 AM         Medications Reviewed:     Current Facility-Administered Medications   Medication Dose Route Frequency    sodium chloride (NS) flush 5-40 mL  5-40 mL IntraVENous Q8H    sodium chloride (NS) flush 5-40 mL  5-40 mL IntraVENous PRN    naloxone (NARCAN) injection 0.4 mg  0.4 mg IntraVENous PRN    amitriptyline (ELAVIL) tablet 10 mg  10 mg Oral QHS    anastrozole (ARIMIDEX) tablet 1 mg  1 mg Oral DAILY    ondansetron (ZOFRAN) injection 4 mg  4 mg IntraVENous Q6H PRN    0.9% sodium chloride with KCl 40 mEq/L infusion   IntraVENous CONTINUOUS     ______________________________________________________________________  EXPECTED LENGTH OF STAY: - - -  ACTUAL LENGTH OF STAY:          0                 Suma Spring MD

## 2020-01-13 NOTE — PROGRESS NOTES
Problem: Mobility Impaired (Adult and Pediatric)  Goal: *Acute Goals and Plan of Care (Insert Text)  Description  FUNCTIONAL STATUS PRIOR TO ADMISSION: Patient was independent and active without use of DME.    HOME SUPPORT PRIOR TO ADMISSION: The patient lived alone with friends to provide assistance - if needed. Daughter lives one hour away from pt's home. Physical Therapy Goals  Initiated 1/13/2020    1. Patient will transfer from bed to chair and chair to bed with independence using the least restrictive device within 7 day(s). 2.  Patient will perform sit to stand with independence within 7 day(s). 3.  Patient will ambulate with independence for > 200 feet - 2 - 3 x day with the least restrictive device within 7 day(s). 4.  Pt will decrease fall risk as evidenced by increase in Tinetti to > 25/28 within 7 days. Outcome: Progressing Towards Goal  PHYSICAL THERAPY EVALUATION  Patient: Jamie Doyle (55 y.o. female)  Date: 1/13/2020  Primary Diagnosis: Syncope [R55]        Precautions:  Fall      ASSESSMENT  Based on the objective data described below, the patient presents s/p syncope episode, and fall hitting head on dresser 1/12/2020. Recent hx - 20 lb weight loss over past 2 - 3 months, on full liquid diet with intermittent vomiting secondary to gastric outlet obstruction - [facility] Pt presenting with generalized weakness, Bp stable with position changes - noted increased HR, decreased endurance and decreased dynamic balance deficits - fall risk. Anticipate pt will be able to increase strength and endurance with increasing activity and improved nutrition. Vitals:    01/13/20 1407 01/13/20 1411 01/13/20 1413 01/13/20 1440   BP: 108/67 104/47 119/75 97/62   BP 1 Location: Left arm Left arm Left arm Left arm   BP Patient Position: At rest;Supine; Head of bed elevated (Comment degrees) Sitting Standing At rest   Pulse: (!) 109 79 (!) 130 (!) 105   Resp:    14   Temp:    97.7 °F (36.5 °C)   SpO2:    97%   Weight:            Current Level of Function Impacting Discharge (mobility/balance): Supervision to CGA for transfers/ambulation    Functional Outcome Measure: The patient scored 23/28 on the Tinetti outcome measure which is indicative of moderate fall risk. Other factors to consider for discharge: daughter plans to accompany pt at discharge to pt's home in [facility]     Patient will benefit from skilled therapy intervention to address the above noted impairments. PLAN :  Recommendations and Planned Interventions: transfer training, gait training, therapeutic exercises, patient and family training/education, and therapeutic activities      Frequency/Duration: Patient will be followed by physical therapy:  5 times a week to address goals. Recommendation for discharge: (in order for the patient to meet his/her long term goals)  No skilled physical therapy/ follow up rehabilitation needs identified at this time. This discharge recommendation:  Has been made in collaboration with the attending provider and/or case management    IF patient discharges home will need the following DME: none         SUBJECTIVE:   Patient stated I have not been OOB yet.     OBJECTIVE DATA SUMMARY:   HISTORY:    Past Medical History:   Diagnosis Date    Breast cancer ([address]     right    Hypercholesteremia    History reviewed. No pertinent surgical history.     Personal factors and/or comorbidities impacting plan of care: pt a retired nurse    Home Situation  Home Environment: Independent living  [address] [place] [address] St: One story  Living Alone: Yes  Patient Expects to be Discharged to[de-identified] Private residence  Current DME Used/Available at Home: None    EXAMINATION/PRESENTATION/DECISION MAKING:   Critical Behavior:  Neurologic State: Alert  Orientation Level: Oriented X4  Cognition: Appropriate decision making, Appropriate safety awareness, Follows commands  Safety/Judgement: Awareness of environment, Good awareness of safety precautions  Hearing: Auditory  Auditory Impairment: None  Range Of Motion:  AROM: Generally decreased, functional                       Strength:    Strength: Generally decreased, functional                    Tone & Sensation:   Tone: Normal              Sensation: Intact               Coordination:  Coordination: Within functional limits  Functional Mobility:  Bed Mobility:  Rolling: Independent  Supine to Sit: Modified independent     Scooting: Modified independent  Transfers:  Sit to Stand: Supervision  Stand to Sit: Supervision        Bed to Chair: Supervision              Balance:   Sitting: Intact; Without support  Standing: Impaired; With support  Standing - Static: Good; Unsupported  Standing - Dynamic : Fair;Unsupported  Ambulation/Gait Training:  Distance (ft): 190 Feet (ft)  Assistive Device: Other (comment)(holding onto IV pole at times, occ reaches out to wall)  Ambulation - Level of Assistance: Contact guard assistance        Gait Abnormalities: Decreased step clearance              Speed/Heather: Slow  Step Length: Right shortened;Left shortened        Functional Measure:  Tinetti test:    Sitting Balance: 1  Arises: 1  Attempts to Rise: 1  Immediate Standing Balance: 2  Standing Balance: 2  Nudged: 2  Eyes Closed: 1  Turn 360 Degrees - Continuous/Discontinuous: 1  Turn 360 Degrees - Steady/Unsteady: 1  Sitting Down: 1  Balance Score: 13 Balance total score  Indication of Gait: 1  R Step Length/Height: 1  L Step Length/Height: 1  R Foot Clearance: 1  L Foot Clearance: 1  Step Symmetry: 1  Step Continuity: 1  Path: 1  Trunk: 1  Walking Time: 1  Gait Score: 10 Gait total score  Total Score: 23/28 Overall total score         Tinetti Tool Score Risk of Falls  <19 = High Fall Risk  19-24 = Moderate Fall Risk  25-28 = Low Fall Risk  Tamiretti ME. Performance-Oriented Assessment of Mobility Problems in Elderly Patients.  [address]; 33:[id number]. (Scoring Description: PT Bulletin Feb. 10, 1993)    Older adults: Candace Marie et al, 2009; n = 1000 [facility] elderly evaluated with ABC, NAS, ADL, and IADL)  · Mean NAS score for males aged 69-68 years = 26.21(3.40)  · Mean NAS score for females age 69-68 years = 25.16(4.30)  · Mean NAS score for males over 80 years = 23.29(6.02)  · Mean NAS score for females over 80 years = 17.20(8.32)           Physical Therapy Evaluation Charge Determination   History Examination Presentation Decision-Making   LOW Complexity : Zero comorbidities / personal factors that will impact the outcome / POC LOW Complexity : 1-2 Standardized tests and measures addressing body structure, function, activity limitation and / or participation in recreation  LOW Complexity : Stable, uncomplicated  LOW Complexity : FOTO score of       Based on the above components, the patient evaluation is determined to be of the following complexity level: LOW     Pain Rating:  Pt denied pain - reported abdominal discomfort - recent baseline. Activity Tolerance: Tolerated OOB, amb in hallway and to bathroom without seated rest.  Please refer to the flowsheet for vital signs taken during this treatment. After treatment patient left in no apparent distress:   Sitting in chair, Call bell within reach, and Caregiver / family present    COMMUNICATION/EDUCATION:   The patients plan of care was discussed with: Registered Nurse. Fall prevention education was provided and the patient/caregiver indicated understanding., Patient/family have participated as able in goal setting and plan of care. , and Patient/family agree to work toward stated goals and plan of care.     Thank you for this referral.  Marva Vásquez, PT   Time Calculation: 25 mins

## 2020-01-13 NOTE — PROGRESS NOTES
Transition of [address] [place] [address] Drive with family assistance once medically stable   Transport: provided by daughter Soledad Blackman: [phone])  Jordan 5 tube surgery   Follow up with PCP/Specialist       Reason for Admission:   Syncope, dizziness, fell and hit head                   RRAT Score:          4, low           Plan for utilizing home health:      None at this time. Current Advanced Directive/Advance Care Plan:  No advanced care plan on file. Transition of Care Plan:            Pt is expected to return home with transportation provided by daughter, pending medical recommendations. Met pt at bedside, introduced CM role, confirmed demographics. Pt currently resides in a alf community/independent living. Pt resides alone in home. Pt reports no use of DME at baseline and no assistance required for ADLs. Pt is expected to return home with transportation provided by daughter Soledad Blackman: [phone]). Pt scheduled for GJ tube surgery where she resides. Pt reports last visit with PCP (Dr. Michelle Portillo) sometime in December. Observation notice provided in writing to patient and/or caregiver as well as verbal explanation of the policy. Patients who are in outpatient status also receive the Observation notice. Care Management Interventions  PCP Verified by CM: Yes(Dr. Michelle Portillo, last visit in December)  Mode of Transport at Discharge:  Other (see comment)(daughter (Soledad Blackman: [phone]))  Physical Therapy Consult: No  Occupational Therapy Consult: No  Speech Therapy Consult: No  Current Support Network: Assisted Living  Confirm Follow Up Transport: Family(sulaiman Blackman: [phone]))  The Patient and/or Patient Representative was Provided with a Choice of Provider and Agrees with the Discharge Plan?: Yes  Discharge Location  Discharge Placement: [facility]      BRIDGER Shay Intern

## 2020-01-13 NOTE — PROGRESS NOTES
Bedside shift change report given to Parish Harding (oncoming nurse) by Stuart Macedo RN (offgoing nurse). Report included the following information SBAR, Kardex and MAR.

## 2020-01-13 NOTE — PROGRESS NOTES
Bedside shift change report given to [address] [place] [address] (oncoming nurse) by Tolu Ferguson (offgoing nurse). Report included the following information SBAR and Kardex. yes

## 2020-01-13 NOTE — ADT AUTH CERT NOTES
______________  CLINICAL  ______________  Patient Demographics     Patient Name  Deejay Blackwood  [id number] Sex  Female   1932 Address  [address] [address] Phone  [phone] (Home)   CSN:   [id number]   [address] [place] [address] Date: 516 [facility] Time Room Bed   2020  9:56  [[id number]] 01 [[id number]]   Attending Providers     Provider Pager From To   Charlene Brown MD  20   Richie Agrawal MD  20   Ellyn Gramajo MD  20    Emergency Contact(s)     Name Relation Home Work [address] [facility] Daughter [phone]     Utilization Reviews         LOC:Acute Adult-Syncope by Colin Pretty RN         Review Entered Review Status   2020 08:56 In Primary       Criteria Review   REVIEW SUMMARY     Patient: Juan Head  Review Number: [id number]  Review Status: In Primary     Condition Specific: Yes        OUTCOMES  Outcome Type: Primary           REVIEW DETAILS     Admit Date: 2020  Product: Kayli Sonia Adult  Subset: Syncope      (Symptom or finding within 24h)         (Excludes PO medications unless noted)     Version: [employer] 2019  Jade Nguyen and [employer]®  © 2019 [employer] and/or one of its [place]. All Rights Reserved. CPT only © 2018 American [employer] Association. All Rights Reserved. Additional Notes   97.5, 113/55, HR 96, RR 31, O2 sat 100, Ra      Meds: Elavil po, NS w 40  ml.hr, Zofran iv, potassium po, potassium iv x 4, NS 1000 ml bolus,       Labs: WBC 11.7, Neutro 78, Lymoh 11, UA protein 30, ketone 40, leuko ruddy trace, bacteria 4+, sodium 132, potassium 2.7, Chloride 85, C2 39, Glucose 117, Bun 42, Albumin 3.1, ALT 82, ,       EKG:   Normal sinus rhythm    ST & T wave abnormality, consider inferolateral ischemia    Prolonged QT    No previous ECGs available      CT HEAD:   No acute abnormality.       49-year-old woman lives in [place], [place] who is here visiting her daughter since [place], came with episode of syncope. 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 CBD obstruction s/p stent and pancreatic abnormal which is still under workup.  She is scheduled for surgery ( GJ tube)  in 6 days in her where she resides. She only can tolerates full liquid diet lately. She still has intermittent vomiting. Lost 20 lbs last 2-3 months. Today,  Patient got out of bed, felt briefly lightheaded and dizzy struck her head on the corner of the dresser. 1. Syncope: likely due to hypovolemia, orthostatic hypotension possible. Per history she also has some aortic stenosis. But patient and daughter are planning to return back [place] tomorrow morning after hydration. No further test will do. She had echo done in [place] recently. 2. Hypokalemia: iv kcl 40meg, will repeat bmp tonight. 3. Hyponatremia: due to poor oral intake, she also took HCTZ, but not taking last 2 days. Should stop HCTZ. 4. HTN: hold bp meds today. Stop HCTZ. Can resume cardizem cd and losartan in am if no more orthostatic hypotension. 5. Gastric outlet obstruciton, patient prefer to go back to [place] to see her surgeon and proceed with her scheduled surgery. H&P Notes      H&P by Gustavo Keane MD at 01/12/20 8352 documented on ED to Hosp-Admission (Current) from 1/12/2020 in [facility] 2N MED SURG   Author: Gustavo Keane MD Author Type: Physician Filed: 01/12/20 2163   Note Status: Signed Cosign: Cosign Not Required Date of Service: 01/12/20 7849   : Gustavo Keane MD (Physician)   Expand All Collapse All                                         History & Physical     Primary Care Provider: Other, MD Maryam  Source of Information: Patient          History of Presenting Illness:   Kandi Mitchell is a 80 y.o. female who presents with syncope      49-year-old woman lives in [place], [place] who is here visiting her daughter since [place], came with episode of syncope.  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 CBD obstruction s/p stent and pancreatic abnormal which is still under workup. She is scheduled for surgery ( GJ tube)  in 6 days in her where she resides. She only can tolerates full liquid diet lately. She still has intermittent vomiting. Lost 20 lbs last 2-3 months. Today, Katy Riggs got out of bed, felt briefly lightheaded and dizzy struck her head on the corner of the dresser.  Denies headache, diplopia, visual disturbance.  Is asymptomatic at this time.  Denies associated chest pain, palpitations, edema.   Denies fevers or chills, dysuria. Had similar syncope in [place] in Dec and told was due to dehydration.        Review of Systems:  General: HPI, sleep ok  HEENT: no headache, no vision changes, no nose discharge, no hearing changes   RES: no wheezing, no cough, no sob  CVS: no cp, no palpitation. Muscular: no joint swelling, no muscle pain, no leg swelling  Skin: no rash, no itching   GI: HPI   : no dysuria, no hematuria  Hemo: no gum bleeding, no petechial   Neuro: no sensation changes, no focal weakness   Endo: no polydipsia   Psych: denied depression           Past Medical History:   Diagnosis Date    Breast cancer ([address]       right    Hypercholesteremia        History reviewed. No pertinent surgical history. Prior to Admission medications    Not on File      No Known Allergies   History reviewed.  No pertinent family history.      SOCIAL HISTORY:  Patient resides:  Independently x   Assisted Living     SNF     With family care         Smoking history:   None x   Former     Chronic        Alcohol history:   None x   Social     Chronic        Ambulates:   Independently x   w/cane     w/walker     w/wc     CODE STATUS:  DNR     Full x   Other        Objective:      Physical Exam:      Visit Vitals  /61   Pulse 89   Temp 97.5 °F (36.4 °C)   Resp 15   SpO2 96%      O2 Device: Room air     General: Alert, cooperative, no distress, appears stated age. Head:  Normocephalic, without obvious abnormality, atraumatic. Eyes:  Conjunctivae/corneas clear. PERRL, EOMs intact. Nose: Nares normal. Septum midline. Mucosa normal. No drainage or sinus tenderness. Throat: Lips, mucosa, and tonguedry. Neck: Supple, symmetrical, trachea midline, no adenopathy, thyroid: no enlargement/tenderness/nodules, no carotid bruit and no JVD. Back:   Symmetric, no curvature. ROM normal. No CVA tenderness. Lungs:   Clear to auscultation bilaterally. Chest wall:  No tenderness or deformity. Heart:  Regular rate and rhythm, S1, S2 normal, no murmur, click, rub or gallop. Abdomen:   Soft, non-tender. Bowel sounds normal. No masses,  No organomegaly. Extremities: Extremities normal, atraumatic, no cyanosis or edema. Pulses: 2+ and symmetric all extremities. Skin: Skin color, texture, turgor normal. No rashes or lesions   Neurologic: CNII-XII intact.  No focal weakness                   Data Review:      Recent Days:      Recent Labs     01/12/20  1019   WBC 11.7*   HGB 15.3   HCT 45.8              Recent Labs     01/12/20  1406 01/12/20  1109   * 132*   K 2.6* 2.7*   CL 85* 85*   CO2 40* 39*   * 117*   BUN 40* 42*   CREA 0.94 1.02   CA 9.3 9.0   MG 2.3 2.4   ALB  --  3.1*   SGOT  --  109*   ALT  --  82*      No results for input(s): PH, PCO2, PO2, HCO3, FIO2 in the last 72 hours.     24 Hour Results:  Recent Results          Recent Results (from the past 24 hour(s))   EKG, 12 LEAD, INITIAL     Collection Time: 01/12/20 10:17 AM   Result Value Ref Range     Ventricular Rate 85 BPM     Atrial Rate 85 BPM     P-R Interval 128 ms     QRS Duration 96 ms     Q-T Interval 432 ms     QTC Calculation (Bezet) 514 ms     Calculated P Axis 64 degrees     Calculated R Axis 72 degrees     Calculated T Axis -60 degrees     Diagnosis           Normal sinus rhythm  ST & T wave abnormality, consider inferolateral ischemia  Prolonged QT  No previous ECGs available      CBC WITH AUTOMATED DIFF     Collection Time: 01/12/20 10:19 AM   Result Value Ref Range     WBC 11.7 (H) 3.6 - 11.0 K/uL     RBC 5.12 3.80 - 5.20 M/uL     HGB 15.3 11.5 - 16.0 g/dL     HCT 45.8 35.0 - 47.0 %     MCV 89.5 80.0 - 99.0 FL     MCH 29.9 26.0 - 34.0 PG     MCHC 33.4 30.0 - 36.5 g/dL     RDW 12.7 11.5 - 14.5 %     PLATELET 259 705 - 340 K/uL     MPV 11.5 8.9 - 12.9 FL     NRBC 0.0 0  WBC     ABSOLUTE NRBC 0.00 0.00 - 0.01 K/uL     NEUTROPHILS 78 (H) 32 - 75 %     LYMPHOCYTES 11 (L) 12 - 49 %     MONOCYTES 10 5 - 13 %     EOSINOPHILS 1 0 - 7 %     BASOPHILS 0 0 - 1 %     IMMATURE GRANULOCYTES 0 0.0 - 0.5 %     ABS. NEUTROPHILS 9.1 (H) 1.8 - 8.0 K/UL     ABS. LYMPHOCYTES 1.3 0.8 - 3.5 K/UL     ABS. MONOCYTES 1.2 (H) 0.0 - 1.0 K/UL     ABS. EOSINOPHILS 0.1 0.0 - 0.4 K/UL     ABS. BASOPHILS 0.1 0.0 - 0.1 K/UL     ABS. IMM. GRANS. 0.1 (H) 0.00 - 0.04 K/UL     DF AUTOMATED     TROPONIN I     Collection Time: 01/12/20 11:09 AM   Result Value Ref Range     Troponin-I, Qt. <0.05 <0.05 ng/mL   LIPASE     Collection Time: 01/12/20 11:09 AM   Result Value Ref Range     Lipase 353 73 - 954 U/L   METABOLIC PANEL, COMPREHENSIVE     Collection Time: 01/12/20 11:09 AM   Result Value Ref Range     Sodium 132 (L) 136 - 145 mmol/L     Potassium 2.7 (LL) 3.5 - 5.1 mmol/L     Chloride 85 (L) 97 - 108 mmol/L     CO2 39 (H) 21 - 32 mmol/L     Anion gap 8 5 - 15 mmol/L     Glucose 117 (H) 65 - 100 mg/dL     BUN 42 (H) 6 - 20 MG/DL     Creatinine 1.02 0.55 - 1.02 MG/DL     BUN/Creatinine ratio 41 (H) 12 - 20       GFR est AA >60 >60 ml/min/1.73m2     GFR est non-AA 51 (L) >60 ml/min/1.73m2     Calcium 9.0 8.5 - 10.1 MG/DL     Bilirubin, total 0.9 0.2 - 1.0 MG/DL     ALT (SGPT) 82 (H) 12 - 78 U/L     AST (SGOT) 109 (H) 15 - 37 U/L     Alk.  phosphatase 92 45 - 117 U/L     Protein, total 6.8 6.4 - 8.2 g/dL     Albumin 3.1 (L) 3.5 - 5.0 g/dL     Globulin 3.7 2.0 - 4.0 g/dL   A-G Ratio 0.8 (L) 1.1 - 2.2     MAGNESIUM     Collection Time: 01/12/20 11:09 AM   Result Value Ref Range     Magnesium 2.4 1.6 - 2.4 mg/dL   EKG, 12 LEAD, INITIAL     Collection Time: 01/12/20  1:33 PM   Result Value Ref Range     Ventricular Rate 93 BPM     Atrial Rate 93 BPM     P-R Interval 118 ms     QRS Duration 92 ms     Q-T Interval 460 ms     QTC Calculation (Bezet) 571 ms     Calculated P Axis 61 degrees     Calculated R Axis 76 degrees     Calculated T Axis 33 degrees     Diagnosis           Normal sinus rhythm  Nonspecific ST and T wave abnormality  Prolonged QT  When compared with ECG of 12-JAN-2020 10:17,  MANUAL COMPARISON REQUIRED, DATA IS UNCONFIRMED      MAGNESIUM     Collection Time: 01/12/20  2:06 PM   Result Value Ref Range     Magnesium 2.3 1.6 - 2.4 mg/dL   METABOLIC PANEL, BASIC     Collection Time: 01/12/20  2:06 PM   Result Value Ref Range     Sodium 133 (L) 136 - 145 mmol/L     Potassium 2.6 (LL) 3.5 - 5.1 mmol/L     Chloride 85 (L) 97 - 108 mmol/L     CO2 40 (H) 21 - 32 mmol/L     Anion gap 8 5 - 15 mmol/L     Glucose 110 (H) 65 - 100 mg/dL     BUN 40 (H) 6 - 20 MG/DL     Creatinine 0.94 0.55 - 1.02 MG/DL     BUN/Creatinine ratio 43 (H) 12 - 20       GFR est AA >60 >60 ml/min/1.73m2     GFR est non-AA 56 (L) >60 ml/min/1.73m2     Calcium 9.3 8.5 - 10.1 MG/DL               Imaging:   Ct Head Wo Cont     Result Date: 1/12/2020  IMPRESSION: No acute abnormality.         Assessment:      Active Problems:    Syncope (1/12/2020)               Plan:      1. Syncope: likely due to hypovolemia, orthostatic hypotension possible. Per history she also has some aortic stenosis. But patient and daughter are planning to return back [place] tomorrow morning after hydration. No further test will do. She had echo done in [place] recently. 2. Hypokalemia: iv kcl 40meg, will repeat bmp tonight. 3. Hyponatremia: due to poor oral intake, she also took HCTZ, but not taking last 2 days.  Should stop HCTZ. 4. HTN: hold bp meds today. Stop HCTZ. Can resume cardizem cd and losartan in am if no more orthostatic hypotension.    5. Gastric outlet obstruciton, patient prefer to go back to [place] to see her surgeon and proceed with her scheduled surgery.          Signed By: Judy Arshad MD      January 12, 2020

## 2020-01-14 VITALS
SYSTOLIC BLOOD PRESSURE: 138 MMHG | RESPIRATION RATE: 16 BRPM | TEMPERATURE: 98.6 F | DIASTOLIC BLOOD PRESSURE: 78 MMHG | OXYGEN SATURATION: 95 % | WEIGHT: 125.22 LBS | HEART RATE: 105 BPM

## 2020-01-14 PROBLEM — E86.0 DEHYDRATION: Status: ACTIVE | Noted: 2020-01-14

## 2020-01-14 PROBLEM — E87.1 HYPONATREMIA: Status: ACTIVE | Noted: 2020-01-14

## 2020-01-14 PROBLEM — E87.6 HYPOKALEMIA: Status: ACTIVE | Noted: 2020-01-14

## 2020-01-14 LAB
ANION GAP SERPL CALC-SCNC: 4 MMOL/L (ref 5–15)
BUN SERPL-MCNC: 16 MG/DL (ref 6–20)
BUN/CREAT SERPL: 28 (ref 12–20)
CALCIUM SERPL-MCNC: 8.9 MG/DL (ref 8.5–10.1)
CHLORIDE SERPL-SCNC: 100 MMOL/L (ref 97–108)
CO2 SERPL-SCNC: 34 MMOL/L (ref 21–32)
CREAT SERPL-MCNC: 0.58 MG/DL (ref 0.55–1.02)
GLUCOSE SERPL-MCNC: 102 MG/DL (ref 65–100)
POTASSIUM SERPL-SCNC: 3.6 MMOL/L (ref 3.5–5.1)
SODIUM SERPL-SCNC: 138 MMOL/L (ref 136–145)

## 2020-01-14 PROCEDURE — 80048 BASIC METABOLIC PNL TOTAL CA: CPT

## 2020-01-14 PROCEDURE — 97165 OT EVAL LOW COMPLEX 30 MIN: CPT

## 2020-01-14 PROCEDURE — 74011250636 HC RX REV CODE- 250/636: Performed by: HOSPITALIST

## 2020-01-14 PROCEDURE — 36415 COLL VENOUS BLD VENIPUNCTURE: CPT

## 2020-01-14 PROCEDURE — 99218 HC RM OBSERVATION: CPT

## 2020-01-14 PROCEDURE — 97535 SELF CARE MNGMENT TRAINING: CPT

## 2020-01-14 PROCEDURE — 96361 HYDRATE IV INFUSION ADD-ON: CPT

## 2020-01-14 PROCEDURE — 96376 TX/PRO/DX INJ SAME DRUG ADON: CPT

## 2020-01-14 RX ADMIN — Medication 10 ML: at 06:36

## 2020-01-14 RX ADMIN — ONDANSETRON 4 MG: 2 INJECTION INTRAMUSCULAR; INTRAVENOUS at 06:35

## 2020-01-14 RX ADMIN — POTASSIUM CHLORIDE AND SODIUM CHLORIDE: 900; 300 INJECTION, SOLUTION INTRAVENOUS at 03:30

## 2020-01-14 NOTE — PROGRESS NOTES
Orders received, chart reviewed and patient evaluated by occupational therapy. Pending progression with skilled acute occupational therapy, recommend:  No skilled occupational therapy/ follow up rehabilitation needs identified at this time. Recommend with nursing patient to complete as able in order to maintain strength, endurance and independence: OOB to chair 3x/day with supervision and functional mobility to the bathroom with supervision. Thank you for your assistance. Full evaluation to follow.

## 2020-01-14 NOTE — DISCHARGE SUMMARY
Discharge Summary       PATIENT ID: Adilson Becerril  MRN: [id number]   YOB: 1932    DATE OF ADMISSION: 1/12/2020  9:56 AM    DATE OF DISCHARGE: 01/14/2020   PRIMARY CARE PROVIDER: Noel Kaur MD     DISCHARGING PROVIDER: Maria Del Carmen Wong MD    To contact this individual call [phone] and ask the  to page. If unavailable ask to be transferred the Adult Hospitalist Department. CONSULTATIONS: IP CONSULT TO HOSPITALIST    PROCEDURES/SURGERIES: * No surgery found *    ADMITTING DIAGNOSES & HOSPITAL COURSE:   Presyncope  Dehydration  Hypokalemia  Hyponatremia    Per admitting attending Dr. Rosalinda Murrieta in [place], [place] who is here visiting her daughter since Christmas, came with episode of syncope. 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 CBD obstruction s/p stent and pancreatic abnormal which is still under workup.  She is scheduled for surgery ( GJ tube)  in 6 days in her where she resides. She only can tolerates full liquid diet lately. She still has intermittent vomiting. Lost 20 lbs last 2-3 months. Cami Frederick got out of bed, felt briefly lightheaded and dizzy struck her head on the corner of the dresser.  Denies headache, diplopia, visual disturbance.  Is asymptomatic at this time.  Denies associated chest pain, palpitations, edema. Denies fevers or chills, dysuria. Patient was admitted to the hospital, started on IV fluids, electrolyte repletion's. Her symptoms of presyncope improved. Her blood pressure medications were held. And blood pressures were actually fairly low in the systolics of 416. Her orthostatic vital signs were positive on admission. Prior to discharge her potassium increased, her sodium became stable after IV fluids. She will be discharged with plans to undergo her GJ-tube, on Friday.   I have asked that she hold her blood  pressure medications until that time    DISCHARGE DIAGNOSES / PLAN:      Presyncope  Patient denies having loss of consciousness. Likely her presyncope is secondary to volume depletion with possible orthostatic hypotension.  - Continue to hold all antihypertensive medications on discharge.     Hyponatremia  Also secondary to hypovolemic hyponatremia. Contributed by HCTZ, now on hold. Sodium level now normal, at 138     Hypokalemia - resolved. At 3.6 on discharge.     Hypertension now with hypotension/normotension  - Hold all home BP medications until after surgery      History of gastric outlet obstruction  Patient to continue with full liquid diet with nutritional supplements. Patient have upcoming feeding tube placement scheduled in the near future in [place] and she gets back there. ADDITIONAL CARE RECOMMENDATIONS:   - Please take all medications as prescribed. Note changes as below.   ---> Hold all blood pressure medications, until you have your surgery, or are told otherwise by your PCP.   - Please make sure to follow up with your primary care physician within 1-2 weeks of discharge for hospital follow up. - Please get up slowly from a seated or laying position, avoid falls. - Stay well hydrated. Due to your gastric outlet obstruction it will be difficult to chug fluids, but you should slowly drink them throughout the day. Take your time getting up. You should wait at least 5 minutes while sitting at the side of the bed before getting up. - Avoid tobacco, alcohol and other illicit drug use.          PENDING TEST RESULTS:   At the time of discharge the following test results are still pending: None    FOLLOW UP APPOINTMENTS:    Follow-up Information     Follow up With Specialties Details Why Contact Info    With your primary care physciain in 1 week                 DIET: Resume previous diet and Full liquid diet  Oral Nutritional Supplements: Ensure Complete or Ensure PlusThree times daily    ACTIVITY: Activity as tolerated    WOUND CARE: None    EQUIPMENT needed: None      DISCHARGE MEDICATIONS:  Current Discharge Medication List      CONTINUE these medications which have NOT CHANGED    Details   anastrozole (ARIMIDEX) 1 mg tablet Take 1 mg by mouth daily. Omeprazole delayed release (PRILOSEC D/R) 20 mg tablet Take 20 mg by mouth daily. ondansetron (ZOFRAN ODT) 4 mg disintegrating tablet Take 4 mg by mouth every eight (8) hours as needed for Nausea or Vomiting. potassium chloride SR (KLOR-CON 10) 10 mEq tablet Take 10 mEq by mouth daily. tiZANidine (ZANAFLEX) 4 mg tablet Take 4 mg by mouth three (3) times daily as needed for Muscle Spasm(s). amitriptyline (ELAVIL) 10 mg tablet Take 10 mg by mouth nightly. aspirin delayed-release 81 mg tablet Take  by mouth daily. STOP taking these medications       dilTIAZem ER (CARDIZEM LA) 180 mg Tb24 tablet Comments:   Reason for Stopping:         hydroCHLOROthiazide (HYDRODIURIL) 25 mg tablet Comments:   Reason for Stopping:         losartan (COZAAR) 50 mg tablet Comments:   Reason for Stopping:                 NOTIFY YOUR PHYSICIAN FOR ANY OF THE FOLLOWING:   Fever over 101 degrees for 24 hours. Chest pain, shortness of breath, fever, chills, nausea, vomiting, diarrhea, change in mentation, falling, weakness, bleeding. Severe pain or pain not relieved by medications. Or, any other signs or symptoms that you may have questions about. DISPOSITION:   X Home With:   OT  PT  HH  RN       Long term SNF/Inpatient Rehab    Independent/assisted living    Hospice    Other:       PATIENT CONDITION AT DISCHARGE:     Functional status    Poor     Deconditioned    X Independent      Cognition    X Lucid     Forgetful     Dementia      Catheters/lines (plus indication)    [place]     PICC     PEG    X None      Code status   X  Full code     DNR      PHYSICAL EXAMINATION AT DISCHARGE:  Visit Vitals  /78 (BP 1 Location: Left arm, BP Patient Position:  At rest)   Pulse (!) 112   Temp 98.6 °F (37 °C)   Resp 16   Wt 56.8 kg (125 lb 3.5 oz)   SpO2 95%     Gen: NAD, awake in chair   HEENT: [place], small abrasion over the L eye, sclera anicteric, PERRL, EOMI  CV: RRR no m/r/g  Resp: CTA b/l no increased work of breathing  Abd: NT/ND, normal bowel sounds  Ext: 2+ pulses, no edema  Skin: No rashes      CHRONIC MEDICAL DIAGNOSES:  Problem List as of 1/14/2020 Date Reviewed: 1/14/2020          Codes Class Noted - Resolved    Dehydration ICD-10-CM: E86.0  ICD-9-CM: 276.51  1/14/2020 - Present        Hypokalemia ICD-10-CM: E87.6  ICD-9-CM: 276.8  1/14/2020 - Present        Hyponatremia ICD-10-CM: E87.1  ICD-9-CM: 276.1  1/14/2020 - Present        Syncope ICD-10-CM: R55  ICD-9-CM: 780.2  1/12/2020 - Present              Greater than 30 minutes were spent with the patient on counseling and coordination of care    Signed:   Augustine Giraldo MD  1/14/2020  9:27 AM

## 2020-01-14 NOTE — PROGRESS NOTES
OCCUPATIONAL THERAPY EVALUATION/DISCHARGE  Patient: Roger Tristan (82 y.o. female)  Date: 1/14/2020  Primary Diagnosis: Syncope [R55]       Precautions:  Fall    ASSESSMENT  Based on the objective data described below, the patient presents with near baseline ADLs s/p admission for syncope. Patient ADLs limited by mild balance impairment and decreased functional activity tolerance. At baseline, patient lives in [address] [place] [address] and was IND/mod I with ADLs, IADLs and mobility. Today, patient completed all ADLs with supervision - IND including toileting, grooming standing at sink, lower body dressing and upper body bathing. Patient BUE ROM, strength, sensation and coordination intact. Patient left sitting upright in chair at end of session with call bell in reach, RN aware. Patient has no further acute OT needs, will sign off. Current Level of Function (ADLs/self-care): supervision-IND    Functional Outcome Measure: The patient scored 70/100 on the Barthel Index outcome measure which is indicative of mild deficits in ADLs and mobility 2* not completing longer distance ambulation or stair this session. Other factors to consider for discharge: none - has good family support     PLAN :  Recommend with staff: Recommend with nursing patient to complete as able in order to maintain strength, endurance and independence: ADLs with supervision/setup, OOB to chair 3x/day and mobilizing to the bathroom for toileting with supervision. Thank you for your assistance. Recommendation for discharge: (in order for the patient to meet his/her long term goals)  No skilled occupational therapy/ follow up rehabilitation needs identified at this time.     This discharge recommendation:  Has not yet been discussed the attending provider and/or case management    IF patient discharges home will need the following DME: patient owns DME required for discharge       SUBJECTIVE:   Patient stated I feel a little nauseated but that's nothing new.    OBJECTIVE DATA SUMMARY:   HISTORY:   Past Medical History:   Diagnosis Date    Breast cancer ([address]     right    Hypercholesteremia    History reviewed. No pertinent surgical history. Prior Level of Function/Environment/Context: lives in [address] [place] [address] and was IND/mod I with ADLs and mobility PTA. Has good family support. Has all needed DME. Expanded or extensive additional review of patient history:   Home Situation  Home Environment: Independent living  # Steps to Enter: 0  One/Two Story Residence: One story  Living Alone: Yes  Support Systems: Family member(s)  Patient Expects to be Discharged to[de-identified] Private residence  Current DME Used/Available at Home: Grab bars, Shower chair(built in bench seat)  Tub or Shower Type: Shower    Hand dominance: Right    EXAMINATION OF PERFORMANCE DEFICITS:  Cognitive/Behavioral Status:  Neurologic State: Alert  Orientation Level: Oriented X4  Cognition: Appropriate for age attention/concentration; Follows commands  Perception: Appears intact  Perseveration: No perseveration noted  Safety/Judgement: Awareness of environment; Fall prevention    Skin: Appears grossly intact    Edema: none noted in BUEs    Hearing: Auditory  Auditory Impairment: None    Vision/Perceptual:    Tracking: Able to track stimulus in all quadrants w/o difficulty                 Diplopia: No    Acuity: Within Defined Limits    Corrective Lenses: Reading glasses    Range of Motion:  In BUEs  AROM: Within functional limits                         Strength: In BUEs  Strength: Generally decreased, functional                Coordination:  Coordination: Within functional limits  Fine Motor Skills-Upper: Left Intact; Right Intact    Gross Motor Skills-Upper: Left Intact; Right Intact    Tone & Sensation:  In BUEs  Tone: Normal  Sensation: Intact                      Balance:  Sitting: Intact  Standing: Impaired; Without support  Standing - Static: Good; Unsupported  Standing - Dynamic : Fair;Unsupported    Functional Mobility and Transfers for ADLs:  Bed Mobility:  Supine to Sit: Modified independent(HOB elevated)    Transfers:  Sit to Stand: Supervision  Stand to Sit: Supervision  Toilet Transfer : Supervision    ADL Assessment:  Feeding: Independent    Oral Facial Hygiene/Grooming: Supervision    Bathing: Supervision    Upper Body Dressing: Supervision    Lower Body Dressing: Supervision    Toileting: Supervision                ADL Intervention and task modifications:       Grooming  Position Performed: Standing  Washing Face: Supervision  Brushing Teeth: Supervision  Brushing/Combing Hair: Supervision    Upper Body Bathing  Bathing Assistance: Supervision  Position Performed: Standing    Lower Body Dressing Assistance  Underpants: Supervision  Socks: Supervision  Leg Crossed Method Used: Yes  Position Performed: Seated edge of bed  Cues: Kenney Riggers  Toileting Assistance: Supervision  Bladder Hygiene: Supervision  Bowel Hygiene: Supervision  Clothing Management: Supervision    Cognitive Retraining  Safety/Judgement: Awareness of environment; Fall prevention    Functional Measure:  Barthel Index:    Bathin  Bladder: 10  Bowels: 10  Groomin  Dressin  Feeding: 10  Mobility: 5  Stairs: 5  Toilet Use: 10  Transfer (Bed to Chair and Back): 10  Total: 70/100        The Barthel ADL Index: Guidelines  1. The index should be used as a record of what a patient does, not as a record of what a patient could do. 2. The main aim is to establish degree of independence from any help, physical or verbal, however minor and for whatever reason. 3. The need for supervision renders the patient not independent. 4. A patient's performance should be established using the best available evidence. Asking the patient, friends/relatives and nurses are the usual sources, but direct observation and common sense are also important. However direct testing is not needed.   5. Usually the patient's performance over the preceding 24-48 hours is important, but occasionally longer periods will be relevant. 6. Middle categories imply that the patient supplies over 50 per cent of the effort. 7. Use of aids to be independent is allowed. Skylar Werner., Barthel, D.W. ([id number]). Functional evaluation: the Barthel Index. [address] [facility] (14)2. JENNIFER Martin, Zechariah Perez., Chalino Edwards., [place], [address] (1999). Measuring the change indisability after inpatient rehabilitation; comparison of the responsiveness of the Barthel Index and Functional [place] Measure. Journal of Neurology, Neurosurgery, and Psychiatry, 66(4), 153-666. Margarita Bravo, N.J.A, MARION Guerrier, & Laurie Cárdenas M.A. (2004.) Assessment of post-stroke quality of life in cost-effectiveness studies: The usefulness of the Barthel Index and the EuroQoL-5D. Quality of Life Research, 15, 912-23       Occupational Therapy Evaluation Charge Determination   History Examination Decision-Making   LOW Complexity : Brief history review  LOW Complexity : 1-3 performance deficits relating to physical, cognitive , or psychosocial skils that result in activity limitations and / or participation restrictions  LOW Complexity : No comorbidities that affect functional and no verbal or physical assistance needed to complete eval tasks       Based on the above components, the patient evaluation is determined to be of the following complexity level: LOW   Pain Rating:  none    Activity Tolerance:   Good  Please refer to the flowsheet for vital signs taken during this treatment. After treatment patient left in no apparent distress:    Sitting in chair, Call bell within reach and RN aware    COMMUNICATION/EDUCATION:   The patients plan of care was discussed with: Physical Therapist and Registered Nurse.     Thank you for this referral.  Chika Rayo OT  Time Calculation: 21 mins

## 2020-01-14 NOTE — PROGRESS NOTES
Bedside shift change report given to Parsih Harding (oncoming nurse) by Rommel Williamson RN (offgoing nurse). Report included the following information SBAR, Kardex and MAR.

## 2020-01-14 NOTE — PROGRESS NOTES
01/14/20 0829   Vital Signs   Temp 98.6 °F (37 °C)   Temp Source Oral   Pulse (Heart Rate) (!) 112   Heart Rate Source Monitor   Resp Rate 16   O2 Sat (%) 95 %   Level of Consciousness Alert   /78   MAP (Calculated) 98   BP 1 Method Automatic   BP 1 Location Left arm   BP Patient Position At rest   MEWS Score 3   Oxygen Therapy   O2 Device Room air   MD aware. .. will recheck

## 2020-01-14 NOTE — DISCHARGE INSTRUCTIONS
Dear Slick Grier,    Thank you for choosing [address] [place] [address] for your healthcare needs. We strive to provide EXCELLENT care to you and your family. In an effort to explain clearly why you were here in the hospital, I've also written a very brief summary below. Other details including formal diagnosis, medication changes, and follow up appointment recommendations can also be found in this packet. You were admitted for falling due to low blood pressure, dehydration for which you were started on IV fluids, and all blood pressure medications were held. You also received care from specialist physicians in the following specialties:  IP CONSULT TO HOSPITALIST    Here are the updates to your medication list:  Hold all blood pressure medications, until you have your surgery, or are told otherwise by your PCP. Remember that it is important for you to take your medications exactly as they are prescribed. It is helpful to keep a list of your medication with the names, dosages, and times to be taken in your wallet. Additionally,     - Please make sure to follow up with your primary care physician within 1-2 weeks of discharge for hospital follow up. - Please get up slowly from a seated or laying position, avoid falls. - Stay well hydrated. Due to your gastric outlet obstruction it will be difficult to chug fluids, but you should slowly drink them throughout the day. Take your time getting up. You should wait at least 5 minutes while sitting at the side of the bed before getting up. - Avoid tobacco, alcohol and other illicit drug use. Make sure to also see your primary care doctor for follow-up. Bring these papers with you and be sure to review your medication list with your doctor. I cannot stress the importance of follow up enough. I've included the information for your follow-up appointments below:     Follow-up Information     Follow up With Specialties Details Why Contact Info    With your primary care physciain in 1 week              At this time, the following test results are still pending: None  Again, please follow-up these results with your primary care provider. Should you have any fever over 101 degrees for 24 hours, chest pain, shortness of breath, fever, chills, nausea, vomiting, diarrhea, change in mentation, falling, weakness, bleeding, or worsening pain, please seek medical attention immediately. Finally, as your discharging physician, you may be receiving a survey regarding my care. I would greatly value and appreciate your input in the survey as we strive for excellence. If you have any questions, I can be reached at [phone].   Thank you so much again for allowing me to care for you at [facility].    Respectfully yours,  Debbi Mujica MD